# Patient Record
Sex: FEMALE | Race: WHITE | NOT HISPANIC OR LATINO | URBAN - METROPOLITAN AREA
[De-identification: names, ages, dates, MRNs, and addresses within clinical notes are randomized per-mention and may not be internally consistent; named-entity substitution may affect disease eponyms.]

---

## 2017-03-06 PROBLEM — O02.1 MISSED ABORTION: Status: ACTIVE | Noted: 2017-03-06

## 2017-03-08 PROBLEM — O02.1 MISSED ABORTION: Status: ACTIVE | Noted: 2017-03-08

## 2018-03-30 PROBLEM — O26.899 VAGINAL DISCHARGE DURING PREGNANCY: Status: ACTIVE | Noted: 2018-03-30

## 2018-03-30 PROBLEM — N89.8 VAGINAL DISCHARGE DURING PREGNANCY: Status: ACTIVE | Noted: 2018-03-30

## 2018-04-26 PROBLEM — Z3A.37 37 WEEKS GESTATION OF PREGNANCY: Status: ACTIVE | Noted: 2018-04-26

## 2019-04-08 PROBLEM — Z3A.37 37 WEEKS GESTATION OF PREGNANCY: Status: RESOLVED | Noted: 2018-04-26 | Resolved: 2019-04-08

## 2023-11-13 ENCOUNTER — NURSE TRIAGE (OUTPATIENT)
Dept: ADMINISTRATIVE | Facility: CLINIC | Age: 43
End: 2023-11-13

## 2023-11-13 NOTE — TELEPHONE ENCOUNTER
Pt states that she experienced rectal bleeding with stools. States that last episode was 2 weeks ago. Pt also c/o rectal pain and severe itching. Also reports constant abdominal pain 7-8/10 for more than 2 hours. Advised to be seen per protocol. Pt states that she's not going into UC/ED and will try calling another doctor.        Reason for Disposition   Constant abdominal pain lasting > 2 hours    Additional Information   Negative: Passed out (i.e., fainted, collapsed and was not responding)   Negative: Shock suspected (e.g., cold/pale/clammy skin, too weak to stand, low BP, rapid pulse)   Negative: Vomiting red blood or black (coffee ground) material   Negative: Sounds like a life-threatening emergency to the triager   Negative: SEVERE rectal bleeding (large blood clots; constant or on and off bleeding)   Negative: SEVERE dizziness (e.g., unable to stand, requires support to walk, feels like passing out now)   Negative: MODERATE rectal bleeding (small blood clots, passing blood without stool, or toilet water turns red) more than once a day   Negative: Bloody, black, or tarry bowel movements  (Exception: Chronic-unchanged black-grey bowel movements and is taking iron pills or Pepto-Bismol.)   Negative: High-risk adult (e.g., prior surgery on aorta, abdominal aortic aneurysm)   Negative: Rectal foreign body (inserted or swallowed)   Negative: SEVERE abdominal pain (e.g., excruciating)    Protocols used: Rectal Bleeding-A-[place]

## 2025-03-13 ENCOUNTER — TELEPHONE (OUTPATIENT)
Dept: OBSTETRICS AND GYNECOLOGY | Facility: CLINIC | Age: 45
End: 2025-03-13

## 2025-03-13 NOTE — TELEPHONE ENCOUNTER
----- Message from Thalia sent at 3/13/2025  2:09 PM CDT -----  Contact: Self  Whit EdmondMRN: [id number] Phone      [phone] Phone      Not on file.Mobile          [phone] Care Team:Sujit Flores MD as PCP - General (Family Medicine)OB? NoWhat phone number can you be reached at? [phone]: pt states she is having discharge from right breast. Pt will be a NP and is requesting Dr Nguyen

## 2025-03-13 NOTE — TELEPHONE ENCOUNTER
Pt was called and she c/o intermittent right nipple discharge x 1 month. Per pt she has had this occur before in 2023 and had benign breast biopsy. Pt requesting Dr. Nguyen. Appt scheduled and she voiced understanding.

## 2025-03-20 NOTE — PROGRESS NOTES
Subjective:    Patient ID: Whit Edmond is a 44 y.o. y.o. female.     Chief Complaint:   Chief Complaint   Patient presents with    Breast Problem     Leaking from breast, pain at times mostly around cycle or when stress, denies any masses        History of Present Illness:   Whit presents for evaluation of right nipple discharge noted by [facility] about 3 weeks ago. She also had a breast biopsy 1 year ago that showed a benign fibroadenoma; no follow up since and is due for annual screening MMG as well. She denies new masses.     ROS:   CONSTITUTIONAL: Negative for fever, chills, diaphoresis, weakness, fatigue, weight loss, weight gain  GASTROINTESTINAL: negative for abdominal pain, flank pain, nausea, vomiting, diarrhea, constipation, black stool, blood in stool  BREAST: per HPI  GENITOURINARY: negative for dysuria, frequency/urgency, hematuria, genital discharge, vaginal bleeding, irregular menses, heavy menses, pelvic pain  HEMATOLOGIC/LYMPHATIC: negative for swollen lymph nodes, bleeding, bruising  MUSCULOSKELETAL: negative for back pain, joint pain, joint stiffness, joint swelling, muscle pain, muscle weakness  ENDOCRINE: negative for polydipsia/polyuria, palpitations, skin changes, temperature intolerance, unexpected weight changes      Physical Exam:   Vital Signs:  Vitals:    03/21/25 1301   BP: 116/74   Pulse: 92        Physical Exam:   Constitutional: She is oriented to person, place, and time. She appears well-developed and well-nourished. No distress.    HENT:   Head: Normocephalic and atraumatic.    Eyes: Conjunctivae and EOM are normal.      Pulmonary/Chest: Effort normal. No respiratory distress. Right breast exhibits mass. Right breast exhibits no nipple discharge, no skin change, no tenderness and no swelling. Left breast exhibits no mass, no nipple discharge, no skin change, no tenderness and no swelling.                  Musculoskeletal: Normal range of motion.       Neurological: She is alert and  oriented to person, place, and time.    Skin: Skin is warm and dry.    Psychiatric: She has a normal mood and affect. Her behavior is normal. Judgment and thought content normal.           Assessment:      1. Discharge from right nipple    2. History of benign breast biopsy          Plan:      Discharge from right nipple  -     PROLACTIN; Future; Expected date: 03/21/2025  -     US Breast Right Limited  -     Mammo Digital Diagnostic Bilat with Moshe (XPD); Future; Expected date: 03/21/2025    History of benign breast biopsy  -     Mammo Digital Diagnostic Bilat with Moshe (XPD); Future; Expected date: 03/21/2025

## 2025-03-21 ENCOUNTER — OFFICE VISIT (OUTPATIENT)
Dept: OBSTETRICS AND GYNECOLOGY | Facility: CLINIC | Age: 45
End: 2025-03-21
Payer: COMMERCIAL

## 2025-03-21 VITALS
WEIGHT: 132.81 LBS | HEART RATE: 92 BPM | BODY MASS INDEX: 22.67 KG/M2 | HEIGHT: 64 IN | DIASTOLIC BLOOD PRESSURE: 74 MMHG | SYSTOLIC BLOOD PRESSURE: 116 MMHG

## 2025-03-21 DIAGNOSIS — Z98.890 HISTORY OF BENIGN BREAST BIOPSY: ICD-10-CM

## 2025-03-21 DIAGNOSIS — N64.52 DISCHARGE FROM RIGHT NIPPLE: Primary | ICD-10-CM

## 2025-03-21 PROCEDURE — 99999 PR PBB SHADOW E&M-EST. PATIENT-LVL III: CPT | Mod: PBBFAC,,, | Performed by: OBSTETRICS & GYNECOLOGY

## 2025-03-21 RX ORDER — UBIDECARENONE 30 MG
30 CAPSULE ORAL 3 TIMES DAILY
COMMUNITY

## 2025-03-21 RX ORDER — VITAMIN B COMPLEX
1 CAPSULE ORAL DAILY
COMMUNITY

## 2025-03-26 ENCOUNTER — HOSPITAL ENCOUNTER (OUTPATIENT)
Dept: RADIOLOGY | Facility: HOSPITAL | Age: 45
Discharge: HOME | End: 2025-03-26
Attending: OBSTETRICS & GYNECOLOGY
Payer: COMMERCIAL

## 2025-03-26 VITALS — BODY MASS INDEX: 22.53 KG/M2 | HEIGHT: 64 IN | WEIGHT: 132 LBS

## 2025-03-26 DIAGNOSIS — Z98.890 HISTORY OF BENIGN BREAST BIOPSY: ICD-10-CM

## 2025-03-26 DIAGNOSIS — N64.52 DISCHARGE FROM RIGHT NIPPLE: ICD-10-CM

## 2025-03-26 PROCEDURE — 77066 DX MAMMO INCL CAD BI: CPT | Mod: 26,,, | Performed by: RADIOLOGY

## 2025-03-26 PROCEDURE — 76641 ULTRASOUND BREAST COMPLETE: CPT | Mod: TC,RT

## 2025-03-26 PROCEDURE — 77062 BREAST TOMOSYNTHESIS BI: CPT | Mod: 26,,, | Performed by: RADIOLOGY

## 2025-03-26 PROCEDURE — 77066 DX MAMMO INCL CAD BI: CPT | Mod: TC

## 2025-03-26 PROCEDURE — 76641 ULTRASOUND BREAST COMPLETE: CPT | Mod: 26,RT,, | Performed by: RADIOLOGY

## 2025-03-27 ENCOUNTER — RESULTS FOLLOW-UP (OUTPATIENT)
Dept: OBSTETRICS AND GYNECOLOGY | Facility: CLINIC | Age: 45
End: 2025-03-27
Payer: COMMERCIAL

## 2025-03-27 DIAGNOSIS — N64.3 GALACTORRHEA: ICD-10-CM

## 2025-03-27 DIAGNOSIS — R79.89 ELEVATED PROLACTIN LEVEL: Primary | ICD-10-CM

## 2025-03-27 DIAGNOSIS — G43.909 MIGRAINE WITHOUT STATUS MIGRAINOSUS, NOT INTRACTABLE, UNSPECIFIED MIGRAINE TYPE: ICD-10-CM

## 2025-03-27 PROCEDURE — 99999 PR PBB SHADOW E&M-EST. PATIENT-LVL I: CPT | Mod: PBBFAC,,, | Performed by: OBSTETRICS & GYNECOLOGY

## 2025-03-28 ENCOUNTER — TELEPHONE (OUTPATIENT)
Dept: OBSTETRICS AND GYNECOLOGY | Facility: CLINIC | Age: 45
End: 2025-03-28
Payer: COMMERCIAL

## 2025-03-28 NOTE — TELEPHONE ENCOUNTER
----- Message from Thalia sent at 3/28/2025  9:58 AM CDT -----  Contact: Self  Whit EdmondMRN: [id number] Phone      Not on file.Work Phone      Not on file.Mobile          [phone] Care Team:Sujit Flores MD as PCP - General (Family Medicine)Hung Nguyen MD as Consulting Physician (Obstetrics and Gynecology)OB? NoWt phone number can you be reached at? [phone]: pt states she spoke to Dr Nguyen this morning about an MRI and is calling with some information that was needed. Wants to know if it has been approved by insurance.Fax Number: [phone] ID Number: [id number]

## 2025-03-28 NOTE — TELEPHONE ENCOUNTER
I attempted to contact the patient. No answer. I left a voice mail asking for my phone call to be returned.

## 2025-03-31 NOTE — TELEPHONE ENCOUNTER
Uriel with [facility] called and stated we need to obtain a PA for this testing. She will need authorization number and expiration date. Can you help with this?

## 2025-04-04 NOTE — TELEPHONE ENCOUNTER
The patient attached her MRI results and would like to know what the next steps are. Please advise.

## 2025-04-08 RX ORDER — AMITRIPTYLINE HYDROCHLORIDE 25 MG/1
25 TABLET, FILM COATED ORAL NIGHTLY
Qty: 30 TABLET | Refills: 2 | Status: SHIPPED | OUTPATIENT
Start: 2025-04-08 | End: 2026-04-08

## 2025-06-10 ENCOUNTER — PATIENT MESSAGE (OUTPATIENT)
Dept: OBSTETRICS AND GYNECOLOGY | Facility: CLINIC | Age: 45
End: 2025-06-10
Payer: COMMERCIAL